# Patient Record
Sex: FEMALE | Race: WHITE | URBAN - METROPOLITAN AREA
[De-identification: names, ages, dates, MRNs, and addresses within clinical notes are randomized per-mention and may not be internally consistent; named-entity substitution may affect disease eponyms.]

---

## 2022-04-27 ENCOUNTER — WEB ENCOUNTER (OUTPATIENT)
Dept: URBAN - METROPOLITAN AREA CLINIC 92 | Facility: CLINIC | Age: 57
End: 2022-04-27

## 2022-05-03 ENCOUNTER — OFFICE VISIT (OUTPATIENT)
Dept: URBAN - METROPOLITAN AREA CLINIC 92 | Facility: CLINIC | Age: 57
End: 2022-05-03
Payer: COMMERCIAL

## 2022-05-03 ENCOUNTER — TELEPHONE ENCOUNTER (OUTPATIENT)
Dept: URBAN - METROPOLITAN AREA CLINIC 92 | Facility: CLINIC | Age: 57
End: 2022-05-03

## 2022-05-03 ENCOUNTER — LAB OUTSIDE AN ENCOUNTER (OUTPATIENT)
Dept: URBAN - METROPOLITAN AREA CLINIC 92 | Facility: CLINIC | Age: 57
End: 2022-05-03

## 2022-05-03 VITALS
HEIGHT: 64 IN | HEART RATE: 70 BPM | SYSTOLIC BLOOD PRESSURE: 108 MMHG | DIASTOLIC BLOOD PRESSURE: 73 MMHG | TEMPERATURE: 96.7 F | BODY MASS INDEX: 20.32 KG/M2 | WEIGHT: 119 LBS

## 2022-05-03 DIAGNOSIS — R19.7 DIARRHEA: ICD-10-CM

## 2022-05-03 PROCEDURE — 99204 OFFICE O/P NEW MOD 45 MIN: CPT | Performed by: INTERNAL MEDICINE

## 2022-05-03 NOTE — HPI-TODAY'S VISIT:
57-year-old woman with a history of Wegener's, on Rituxan, who presents to discuss 1 year of diarrhea.  Has intermittent diarrhea, occasionally liquid, no blood, also has intermittently normal bowel movements.  Gas and bloating, has lost about 40 pounds of weight, but she does not know over what period of time.  She has celiac disease and has been strictly gluten-free for 12 years.  She tried going dairy free for a while, no help.  Recent hemoglobin okay.  Denies abdominal pain.  Last colonoscopy at least 5 years ago.  Recently cut out almond milk, this seems to help over the past week or so.

## 2022-05-04 ENCOUNTER — WEB ENCOUNTER (OUTPATIENT)
Dept: URBAN - METROPOLITAN AREA CLINIC 92 | Facility: CLINIC | Age: 57
End: 2022-05-04

## 2022-05-04 LAB
A/G RATIO: 2.2
ALBUMIN: 4.4
ALKALINE PHOSPHATASE: 50
ALT (SGPT): 22
AST (SGOT): 22
BASO (ABSOLUTE): 0
BASOS: 1
BILIRUBIN, TOTAL: 0.3
BUN/CREATININE RATIO: 16
BUN: 12
CALCIUM: 9
CARBON DIOXIDE, TOTAL: 24
CHLORIDE: 105
CREATININE: 0.73
DEAMIDATED GLIADIN ABS, IGA: 4
DEAMIDATED GLIADIN ABS, IGG: 1
EGFR: 97
ENDOMYSIAL ANTIBODY IGA: NEGATIVE
EOS (ABSOLUTE): 0.2
EOS: 3
GLOBULIN, TOTAL: 2
GLUCOSE: 93
HEMATOCRIT: 44.5
HEMATOLOGY COMMENTS:: (no result)
HEMOGLOBIN: 14
IMMATURE CELLS: (no result)
IMMATURE GRANS (ABS): 0
IMMATURE GRANULOCYTES: 0
IMMUNOGLOBULIN A, QN, SERUM: 211
LYMPHS (ABSOLUTE): 1.8
LYMPHS: 27
MCH: 32
MCHC: 31.5
MCV: 102
MONOCYTES(ABSOLUTE): 0.4
MONOCYTES: 6
NEUTROPHILS (ABSOLUTE): 4.1
NEUTROPHILS: 63
NRBC: (no result)
PLATELETS: 272
POTASSIUM: 4.2
PROTEIN, TOTAL: 6.4
RBC: 4.37
RDW: 14.2
SODIUM: 143
T-TRANSGLUTAMINASE (TTG) IGA: <2
T-TRANSGLUTAMINASE (TTG) IGG: 3
TSH: 0.97
WBC: 6.5

## 2022-05-10 LAB — PANCREATIC ELASTASE, FECAL: 143

## 2022-05-11 ENCOUNTER — LAB OUTSIDE AN ENCOUNTER (OUTPATIENT)
Dept: URBAN - METROPOLITAN AREA CLINIC 92 | Facility: CLINIC | Age: 57
End: 2022-05-11

## 2022-05-11 ENCOUNTER — TELEPHONE ENCOUNTER (OUTPATIENT)
Dept: URBAN - METROPOLITAN AREA CLINIC 92 | Facility: CLINIC | Age: 57
End: 2022-05-11

## 2022-05-13 ENCOUNTER — TELEPHONE ENCOUNTER (OUTPATIENT)
Dept: URBAN - METROPOLITAN AREA CLINIC 92 | Facility: CLINIC | Age: 57
End: 2022-05-13

## 2022-06-07 ENCOUNTER — WEB ENCOUNTER (OUTPATIENT)
Dept: URBAN - METROPOLITAN AREA CLINIC 92 | Facility: CLINIC | Age: 57
End: 2022-06-07

## 2022-06-16 ENCOUNTER — WEB ENCOUNTER (OUTPATIENT)
Dept: URBAN - METROPOLITAN AREA CLINIC 92 | Facility: CLINIC | Age: 57
End: 2022-06-16

## 2022-06-20 ENCOUNTER — TELEPHONE ENCOUNTER (OUTPATIENT)
Dept: URBAN - METROPOLITAN AREA CLINIC 92 | Facility: CLINIC | Age: 57
End: 2022-06-20

## 2022-06-20 ENCOUNTER — WEB ENCOUNTER (OUTPATIENT)
Dept: URBAN - METROPOLITAN AREA CLINIC 92 | Facility: CLINIC | Age: 57
End: 2022-06-20

## 2022-06-20 ENCOUNTER — OFFICE VISIT (OUTPATIENT)
Dept: URBAN - METROPOLITAN AREA CLINIC 92 | Facility: CLINIC | Age: 57
End: 2022-06-20
Payer: COMMERCIAL

## 2022-06-20 ENCOUNTER — LAB OUTSIDE AN ENCOUNTER (OUTPATIENT)
Dept: URBAN - METROPOLITAN AREA CLINIC 92 | Facility: CLINIC | Age: 57
End: 2022-06-20

## 2022-06-20 VITALS
TEMPERATURE: 97 F | BODY MASS INDEX: 18.44 KG/M2 | HEART RATE: 84 BPM | HEIGHT: 64 IN | WEIGHT: 108 LBS | DIASTOLIC BLOOD PRESSURE: 81 MMHG | SYSTOLIC BLOOD PRESSURE: 122 MMHG

## 2022-06-20 DIAGNOSIS — R19.7 DIARRHEA: ICD-10-CM

## 2022-06-20 DIAGNOSIS — R19.7 DIARRHEA, UNSPECIFIED TYPE: ICD-10-CM

## 2022-06-20 PROCEDURE — 99214 OFFICE O/P EST MOD 30 MIN: CPT | Performed by: INTERNAL MEDICINE

## 2022-06-20 NOTE — HPI-TODAY'S VISIT:
57-year-old woman with a history of Wegener's, on Rituxan, who presents to discuss 1 year of diarrhea.  Has intermittent diarrhea, occasionally liquid, no blood, also has intermittently normal bowel movements.  Gas and bloating, has lost about 40 pounds of weight, but she does not know over what period of time.  She has celiac disease and has been strictly gluten-free for 12 years.  She tried going dairy free for a while, no help.  Recent hemoglobin okay.  Denies abdominal pain.  Last colonoscopy at least 5 years ago.  Recently cut out almond milk, this seems to help over the past week or so.  6/20/22:Labs notable for low pancreatic elastase.  CT with a normal pancreas.  SIBO testing positive, underwent rifaximin treatment with no improvement.  She is doing worse, losing weight, having 6-8 stools per day, nonbloody, unclear relationship to p.o. intake.  She feels dehydrated.  Takes Imodium 1 tablet/day.  She did not get the colonoscopy done, as recommended.

## 2022-06-21 ENCOUNTER — LAB OUTSIDE AN ENCOUNTER (OUTPATIENT)
Dept: URBAN - METROPOLITAN AREA CLINIC 92 | Facility: CLINIC | Age: 57
End: 2022-06-21

## 2022-06-21 ENCOUNTER — TELEPHONE ENCOUNTER (OUTPATIENT)
Dept: URBAN - METROPOLITAN AREA CLINIC 92 | Facility: CLINIC | Age: 57
End: 2022-06-21

## 2022-06-21 LAB
A/G RATIO: 1.8
ALBUMIN: 4.2
ALKALINE PHOSPHATASE: 76
ALT (SGPT): 104
AST (SGOT): 72
BASO (ABSOLUTE): 0
BASOS: 1
BILIRUBIN, TOTAL: 0.4
BUN/CREATININE RATIO: 13
BUN: 9
CALCIUM: 9.2
CARBON DIOXIDE, TOTAL: 22
CHLORIDE: 104
CREATININE: 0.72
EGFR: 99
EOS (ABSOLUTE): 0.1
EOS: 2
GLOBULIN, TOTAL: 2.3
GLUCOSE: 94
HEMATOCRIT: 47.9
HEMATOLOGY COMMENTS:: (no result)
HEMOGLOBIN: 15.4
IMMATURE CELLS: (no result)
IMMATURE GRANS (ABS): 0
IMMATURE GRANULOCYTES: 0
LYMPHS (ABSOLUTE): 1.5
LYMPHS: 23
MCH: 33.3
MCHC: 32.2
MCV: 104
MONOCYTES(ABSOLUTE): 0.5
MONOCYTES: 7
NEUTROPHILS (ABSOLUTE): 4.5
NEUTROPHILS: 67
NRBC: (no result)
PLATELETS: 286
POTASSIUM: 3.7
PROTEIN, TOTAL: 6.5
RBC: 4.63
RDW: 14.3
SODIUM: 144
WBC: 6.7

## 2022-06-22 ENCOUNTER — WEB ENCOUNTER (OUTPATIENT)
Dept: URBAN - METROPOLITAN AREA CLINIC 92 | Facility: CLINIC | Age: 57
End: 2022-06-22

## 2022-06-23 ENCOUNTER — OFFICE VISIT (OUTPATIENT)
Dept: URBAN - METROPOLITAN AREA SURGERY CENTER 16 | Facility: SURGERY CENTER | Age: 57
End: 2022-06-23
Payer: COMMERCIAL

## 2022-06-23 DIAGNOSIS — K63.89 BACTERIAL OVERGROWTH SYNDROME: ICD-10-CM

## 2022-06-23 DIAGNOSIS — R19.7 ACUTE DIARRHEA: ICD-10-CM

## 2022-06-23 PROCEDURE — G8907 PT DOC NO EVENTS ON DISCHARG: HCPCS | Performed by: INTERNAL MEDICINE

## 2022-06-23 PROCEDURE — 45380 COLONOSCOPY AND BIOPSY: CPT | Performed by: INTERNAL MEDICINE

## 2022-06-24 ENCOUNTER — TELEPHONE ENCOUNTER (OUTPATIENT)
Dept: URBAN - METROPOLITAN AREA CLINIC 92 | Facility: CLINIC | Age: 57
End: 2022-06-24

## 2022-06-25 LAB — GASTROINTESTINAL PATHOGEN: (no result)

## 2022-06-27 ENCOUNTER — WEB ENCOUNTER (OUTPATIENT)
Dept: URBAN - METROPOLITAN AREA CLINIC 92 | Facility: CLINIC | Age: 57
End: 2022-06-27

## 2022-06-27 ENCOUNTER — TELEPHONE ENCOUNTER (OUTPATIENT)
Dept: URBAN - METROPOLITAN AREA CLINIC 92 | Facility: CLINIC | Age: 57
End: 2022-06-27

## 2022-06-27 RX ORDER — SULFAMETHOXAZOLE AND TRIMETHOPRIM 800; 160 MG/1; MG/1
1 TABLET TABLET ORAL TWICE A DAY
Qty: 20 | OUTPATIENT
Start: 2022-06-27 | End: 2022-07-07

## 2022-06-29 ENCOUNTER — WEB ENCOUNTER (OUTPATIENT)
Dept: URBAN - METROPOLITAN AREA CLINIC 92 | Facility: CLINIC | Age: 57
End: 2022-06-29

## 2022-06-30 ENCOUNTER — OFFICE VISIT (OUTPATIENT)
Dept: URBAN - METROPOLITAN AREA CLINIC 91 | Facility: CLINIC | Age: 57
End: 2022-06-30
Payer: COMMERCIAL

## 2022-06-30 DIAGNOSIS — K82.8 GALLBLADDER SLUDGE: ICD-10-CM

## 2022-06-30 DIAGNOSIS — R10.11 ABDOMINAL BURNING SENSATION IN RIGHT UPPER QUADRANT: ICD-10-CM

## 2022-06-30 DIAGNOSIS — R79.89 ABNORMAL LIVER FUNCTION TEST: ICD-10-CM

## 2022-06-30 PROCEDURE — 76700 US EXAM ABDOM COMPLETE: CPT | Performed by: INTERNAL MEDICINE

## 2022-06-30 RX ORDER — SULFAMETHOXAZOLE AND TRIMETHOPRIM 800; 160 MG/1; MG/1
1 TABLET TABLET ORAL TWICE A DAY
Qty: 20 | Status: ACTIVE | COMMUNITY
Start: 2022-06-27 | End: 2022-07-07

## 2022-07-06 ENCOUNTER — WEB ENCOUNTER (OUTPATIENT)
Dept: URBAN - METROPOLITAN AREA CLINIC 92 | Facility: CLINIC | Age: 57
End: 2022-07-06

## 2022-07-12 LAB
ANA DIRECT: NEGATIVE
ANTI-SMOOTH MUSCLE AB BY IFA: (no result)
FERRITIN, SERUM: 57
HBSAG SCREEN: NEGATIVE
HCV AB: 0.1
HEP B CORE AB, TOT: NEGATIVE
HEP B SURFACE AB, QUAL: NON REACTIVE
INTERPRETATION: (no result)
INTERPRETATION:: (no result)
IRON BIND.CAP.(TIBC): 250
IRON SATURATION: 47
IRON: 117
RFX TO HBC IGM: (no result)
UIBC: 133

## 2022-07-14 ENCOUNTER — OFFICE VISIT (OUTPATIENT)
Dept: URBAN - METROPOLITAN AREA CLINIC 92 | Facility: CLINIC | Age: 57
End: 2022-07-14
Payer: COMMERCIAL

## 2022-07-14 ENCOUNTER — TELEPHONE ENCOUNTER (OUTPATIENT)
Dept: URBAN - METROPOLITAN AREA CLINIC 92 | Facility: CLINIC | Age: 57
End: 2022-07-14

## 2022-07-14 VITALS
DIASTOLIC BLOOD PRESSURE: 71 MMHG | BODY MASS INDEX: 19.97 KG/M2 | WEIGHT: 117 LBS | HEART RATE: 54 BPM | SYSTOLIC BLOOD PRESSURE: 120 MMHG | TEMPERATURE: 96.7 F | HEIGHT: 64 IN

## 2022-07-14 DIAGNOSIS — A07.4 INFECTION DUE TO CYCLOSPORA SPECIES: ICD-10-CM

## 2022-07-14 PROCEDURE — 99214 OFFICE O/P EST MOD 30 MIN: CPT | Performed by: INTERNAL MEDICINE

## 2022-07-14 NOTE — HPI-TODAY'S VISIT:
57-year-old woman with a history of Wegener's, on Rituxan, who presents to discuss 1 year of diarrhea.  Has intermittent diarrhea, occasionally liquid, no blood, also has intermittently normal bowel movements.  Gas and bloating, has lost about 40 pounds of weight, but she does not know over what period of time.  She has celiac disease and has been strictly gluten-free for 12 years.  She tried going dairy free for a while, no help.  Recent hemoglobin okay.  Denies abdominal pain.  Last colonoscopy at least 5 years ago.  Recently cut out almond milk, this seems to help over the past week or so.  6/20/22:Labs notable for low pancreatic elastase.  CT with a normal pancreas.  SIBO testing positive, underwent rifaximin treatment with no improvement.  She is doing worse, losing weight, having 6-8 stools per day, nonbloody, unclear relationship to p.o. intake.  She feels dehydrated.  Takes Imodium 1 tablet/day.  She did not get the colonoscopy done, as recommended.  ***7/14/22: Colonoscopy June 2022 demonstrated terminal ileitis, biopsies with increased intraepithelial lymphocytes with no histologic evidence of active inflammation.  Colon biopsies normal.  Labs notable for AST 72 , previously normal.  Normal bilirubin and alkaline phosphatase.  Additional blood work ordered and unremarkable.  Right upper quadrant ultrasound with small gallbladder polyp and sludge.  Stool test returned positive for Cyclospora, treated with Bactrim course.  Since the Bactrim course, for the past 3 weeks, she is doing exceptionally well, 2 bowel movements per day, formed, has gained at least 10 pounds, eating anything she wants with no diarrhea.  She has not had any Rituxan, but plans on resuming.

## 2022-09-01 LAB
ALBUMIN: 4.5
ALKALINE PHOSPHATASE: 63
ALT (SGPT): 18
AST (SGOT): 18
BILIRUBIN, DIRECT: 0.13
BILIRUBIN, TOTAL: 0.3
PROTEIN, TOTAL: 6.7

## 2023-01-11 ENCOUNTER — OFFICE VISIT (OUTPATIENT)
Dept: URBAN - METROPOLITAN AREA CLINIC 92 | Facility: CLINIC | Age: 58
End: 2023-01-11

## 2023-01-12 ENCOUNTER — DASHBOARD ENCOUNTERS (OUTPATIENT)
Age: 58
End: 2023-01-12

## 2023-01-12 ENCOUNTER — TELEPHONE ENCOUNTER (OUTPATIENT)
Dept: URBAN - METROPOLITAN AREA CLINIC 92 | Facility: CLINIC | Age: 58
End: 2023-01-12

## 2023-01-12 ENCOUNTER — LAB OUTSIDE AN ENCOUNTER (OUTPATIENT)
Dept: URBAN - METROPOLITAN AREA CLINIC 92 | Facility: CLINIC | Age: 58
End: 2023-01-12

## 2023-01-12 ENCOUNTER — OFFICE VISIT (OUTPATIENT)
Dept: URBAN - METROPOLITAN AREA CLINIC 92 | Facility: CLINIC | Age: 58
End: 2023-01-12
Payer: COMMERCIAL

## 2023-01-12 VITALS
HEIGHT: 64 IN | TEMPERATURE: 96.9 F | BODY MASS INDEX: 24.59 KG/M2 | HEART RATE: 62 BPM | WEIGHT: 144 LBS | DIASTOLIC BLOOD PRESSURE: 81 MMHG | SYSTOLIC BLOOD PRESSURE: 123 MMHG

## 2023-01-12 DIAGNOSIS — A07.4 INFECTION DUE TO CYCLOSPORA SPECIES: ICD-10-CM

## 2023-01-12 PROCEDURE — 99214 OFFICE O/P EST MOD 30 MIN: CPT | Performed by: INTERNAL MEDICINE

## 2023-01-12 NOTE — HPI-TODAY'S VISIT:
57-year-old woman with a history of Wegener's, on Rituxan, who presents to discuss 1 year of diarrhea.  Has intermittent diarrhea, occasionally liquid, no blood, also has intermittently normal bowel movements.  Gas and bloating, has lost about 40 pounds of weight, but she does not know over what period of time.  She has celiac disease and has been strictly gluten-free for 12 years.  She tried going dairy free for a while, no help.  Recent hemoglobin okay.  Denies abdominal pain.  Last colonoscopy at least 5 years ago.  Recently cut out almond milk, this seems to help over the past week or so.  6/20/22:Labs notable for low pancreatic elastase.  CT with a normal pancreas.  SIBO testing positive, underwent rifaximin treatment with no improvement.  She is doing worse, losing weight, having 6-8 stools per day, nonbloody, unclear relationship to p.o. intake.  She feels dehydrated.  Takes Imodium 1 tablet/day.  She did not get the colonoscopy done, as recommended.  ***7/14/22: Colonoscopy June 2022 demonstrated terminal ileitis, biopsies with increased intraepithelial lymphocytes with no histologic evidence of active inflammation.  Colon biopsies normal.  Labs notable for AST 72 , previously normal.  Normal bilirubin and alkaline phosphatase.  Additional blood work ordered and unremarkable.  Right upper quadrant ultrasound with small gallbladder polyp and sludge.  Stool test returned positive for Cyclospora, treated with Bactrim course.  Since the Bactrim course, for the past 3 weeks, she is doing exceptionally well, 2 bowel movements per day, formed, has gained at least 10 pounds, eating anything she wants with no diarrhea.  She has not had any Rituxan, but plans on resuming.  ***1/12/23: Doing great, no diarrhea, back on Rituxan.

## 2023-01-18 ENCOUNTER — OFFICE VISIT (OUTPATIENT)
Dept: URBAN - METROPOLITAN AREA CLINIC 92 | Facility: CLINIC | Age: 58
End: 2023-01-18

## 2023-01-19 ENCOUNTER — OFFICE VISIT (OUTPATIENT)
Dept: URBAN - METROPOLITAN AREA CLINIC 91 | Facility: CLINIC | Age: 58
End: 2023-01-19
Payer: COMMERCIAL

## 2023-01-19 DIAGNOSIS — K82.4 GALLBLADDER POLYP: ICD-10-CM

## 2023-01-19 PROCEDURE — 76705 ECHO EXAM OF ABDOMEN: CPT | Performed by: INTERNAL MEDICINE

## 2023-05-14 ENCOUNTER — WEB ENCOUNTER (OUTPATIENT)
Dept: URBAN - METROPOLITAN AREA CLINIC 92 | Facility: CLINIC | Age: 58
End: 2023-05-14

## 2023-05-14 RX ORDER — SULFAMETHOXAZOLE AND TRIMETHOPRIM 800; 160 MG/1; MG/1
1 TABLET TABLET ORAL TWICE A DAY
Qty: 20
Start: 2022-06-27 | End: 2023-05-25

## 2023-05-15 ENCOUNTER — WEB ENCOUNTER (OUTPATIENT)
Dept: URBAN - METROPOLITAN AREA CLINIC 92 | Facility: CLINIC | Age: 58
End: 2023-05-15

## 2023-06-05 ENCOUNTER — WEB ENCOUNTER (OUTPATIENT)
Dept: URBAN - METROPOLITAN AREA CLINIC 92 | Facility: CLINIC | Age: 58
End: 2023-06-05

## 2023-06-05 RX ORDER — SULFAMETHOXAZOLE AND TRIMETHOPRIM 800; 160 MG/1; MG/1
1 TABLET TABLET ORAL TWICE A DAY
Qty: 20 | Refills: 2
Start: 2022-06-27 | End: 2023-07-06